# Patient Record
Sex: MALE | Race: WHITE | NOT HISPANIC OR LATINO | URBAN - METROPOLITAN AREA
[De-identification: names, ages, dates, MRNs, and addresses within clinical notes are randomized per-mention and may not be internally consistent; named-entity substitution may affect disease eponyms.]

---

## 2023-01-23 ENCOUNTER — OFFICE VISIT (OUTPATIENT)
Dept: URBAN - METROPOLITAN AREA CLINIC 32 | Facility: CLINIC | Age: 58
End: 2023-01-23
Payer: COMMERCIAL

## 2023-01-23 DIAGNOSIS — H43.12 VITREOUS HEMORRHAGE, LEFT EYE: Primary | ICD-10-CM

## 2023-01-23 PROCEDURE — 92134 CPTRZ OPH DX IMG PST SGM RTA: CPT

## 2023-01-23 PROCEDURE — 99204 OFFICE O/P NEW MOD 45 MIN: CPT

## 2023-01-23 ASSESSMENT — INTRAOCULAR PRESSURE
OD: 6
OS: 8

## 2023-01-23 NOTE — IMPRESSION/PLAN
Impression: Vitreous hemorrhage, left eye: H43.12.
--hospitalized 1/21/2023 for possible stroke Plan: Pt educated on findings. Mac OCT shows flat and intact OD but unable to obtain scan OS. DFE shows large central vit heme OS with a limited view of the periphery. Retina appears flat and in-tact in periphery otherwise. Referral sent to Dr. Lola Tiwari for further evaluation and management.

## 2023-01-27 ENCOUNTER — OFFICE VISIT (OUTPATIENT)
Dept: URBAN - METROPOLITAN AREA CLINIC 23 | Facility: CLINIC | Age: 58
End: 2023-01-27
Payer: COMMERCIAL

## 2023-01-27 DIAGNOSIS — E11.9 TYPE 2 DIABETES W/O COMPLICATIONS: ICD-10-CM

## 2023-01-27 DIAGNOSIS — E11.3592 DIABETES MELLITUS TYPE 2 WITH PROLIFERATIVE RETINOPATHY WITHOUT MACULAR EDEMA, LEFT EYE: Primary | ICD-10-CM

## 2023-01-27 PROCEDURE — 92134 CPTRZ OPH DX IMG PST SGM RTA: CPT | Performed by: OPHTHALMOLOGY

## 2023-01-27 PROCEDURE — 99204 OFFICE O/P NEW MOD 45 MIN: CPT | Performed by: OPHTHALMOLOGY

## 2023-01-27 PROCEDURE — 67028 INJECTION EYE DRUG: CPT | Performed by: OPHTHALMOLOGY

## 2023-01-27 ASSESSMENT — INTRAOCULAR PRESSURE
OD: 17
OS: 16

## 2023-01-27 NOTE — IMPRESSION/PLAN
Impression: Vitreous hemorrhage, left eye associated with PDR: H43.12. Left. Condition: unstable. Vision: vision affected. Plan: Discussed diagnosis in detail with patient. Discussed risks of progression. Recommend FATOUMATA tx OS - see notes above.

## 2023-01-27 NOTE — IMPRESSION/PLAN
Impression: Type 2 diabetes w/o complications: O86.4. Right. Condition: new prob, no addtl w/u needed. Vision: vision not affected. Plan: Discussed diagnosis in detail with patient. Exam shows no Diabetic changes OD. No treatment is recommended at this time. Emphasized blood sugar control and advised to keep future appointments with PCP and/or Endocrinologist for the management of Diabetes. Recommend observation for now. OCT OD is normal and Optos OD shows the macula / retina is stable.

## 2023-01-27 NOTE — IMPRESSION/PLAN
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 Left. Condition: new problem addtl w/u needed. Vision: vision affected. Diagnosed with DM x 3 mos ago. Plan: Discussed diagnosis in detail with patient. Discussed risks of progression. Based on today's exam, diagnostic studies and review of records, recommend Intravitreal Injection Treatment LEFT EYE with AVASTIN to help reduce the bleeding in order to prevent a further reduction in vision. Discussed the risks and benefits of tx. All questions answered. Patient elects to proceed with recommendation. OCT OS unable to obtain and Optos OS shows VH. If no change or improvement in the future, surgery can be consider to remove the blood.

## 2023-03-01 ENCOUNTER — OFFICE VISIT (OUTPATIENT)
Dept: URBAN - METROPOLITAN AREA CLINIC 23 | Facility: CLINIC | Age: 58
End: 2023-03-01
Payer: COMMERCIAL

## 2023-03-01 DIAGNOSIS — H33.022 RETINAL DETACHMENT WITH MULTIPLE BREAKS, LEFT EYE: Primary | ICD-10-CM

## 2023-03-01 DIAGNOSIS — H43.12 VITREOUS HEMORRHAGE, LEFT EYE: ICD-10-CM

## 2023-03-01 PROCEDURE — 99214 OFFICE O/P EST MOD 30 MIN: CPT | Performed by: OPHTHALMOLOGY

## 2023-03-01 PROCEDURE — 92134 CPTRZ OPH DX IMG PST SGM RTA: CPT | Performed by: OPHTHALMOLOGY

## 2023-03-01 RX ORDER — PREDNISOLONE ACETATE 10 MG/ML
1 % SUSPENSION/ DROPS OPHTHALMIC
Qty: 10 | Refills: 1 | Status: ACTIVE
Start: 2023-03-01

## 2023-03-01 RX ORDER — OFLOXACIN 3 MG/ML
0.3 % SOLUTION/ DROPS OPHTHALMIC
Qty: 5 | Refills: 1 | Status: ACTIVE
Start: 2023-03-01

## 2023-03-01 ASSESSMENT — INTRAOCULAR PRESSURE
OS: 11
OD: 16

## 2023-03-01 NOTE — IMPRESSION/PLAN
Impression: Retinal detachment with multiple breaks, left eye: H33.022. Left. Condition: unstable. Vision: vision affected. Plan: Discussed diagnosis in detail with patient. Exam OS shows superior retinal tear, multiple tears. Discussed risks of progression. Surgical treatment is recommended to repair the retina PPVx LEFT EYE. Surgical risks and benefits were discussed, explained and understood by patient. Unable to tell how much vision will be recovered. Discussed gas bubble and post-op care: no traveling, flying or driving to high altitude for approximately 6 - 8 weeks. Advise patient that he will not be able to see after the patch is removed on 1 day post-op and will not be able to see for weeks. As previously mentioned, the vision will slowly improve as the gas dissolves and will continue to slowly improve long after the gas is gone. All questions answered. Patient elects to proceed with recommendation. RL1. E'rxed Ofloxacin and Prednisolone. OCT and Optos OS shows retinal detachment.

## 2023-03-01 NOTE — IMPRESSION/PLAN
Impression: Vitreous hemorrhage, left eye: H43.12. Left. Condition: unstable. Vision: vision affected. s/p AV OS #1 01/27/23 Plan: Discussed diagnosis in detail with patient. Discussed risks of progression. Recommend surgery OS - see notes above.

## 2023-03-03 ENCOUNTER — POST-OPERATIVE VISIT (OUTPATIENT)
Dept: URBAN - METROPOLITAN AREA CLINIC 32 | Facility: CLINIC | Age: 58
End: 2023-03-03
Payer: COMMERCIAL

## 2023-03-03 DIAGNOSIS — Z48.810 ENCOUNTER FOR SURGICAL AFTERCARE FOLLOWING SURGERY ON A SENSE ORGAN: Primary | ICD-10-CM

## 2023-03-03 PROCEDURE — 99024 POSTOP FOLLOW-UP VISIT: CPT | Performed by: OPTOMETRIST

## 2023-03-03 ASSESSMENT — INTRAOCULAR PRESSURE
OS: 16
OD: 16

## 2023-03-03 NOTE — IMPRESSION/PLAN
Impression: S/P 22841; 25G Posterior Vitrectomy; Laser photocoagulation: Endolaser; [id number]; Intravitreal injection of medications: Kenalog; Intravitreal Injection of gas: C3F8 18%; AFX (Air Fluid Gas Exchange) OS - 1 Day. Encounter for surgical aftercare following surgery on a sense organ  Z48.810. Plan: The surgical eye(s) is improving well. Continue to follow current drop plan and post operative instructions. Recommend artificial tears throughout post operative period. Patient's blood sugar was 69 in clinic, gave patient crackers and cookies in clinic.

## 2023-04-04 ENCOUNTER — POST-OPERATIVE VISIT (OUTPATIENT)
Dept: URBAN - METROPOLITAN AREA CLINIC 23 | Facility: CLINIC | Age: 58
End: 2023-04-04
Payer: COMMERCIAL

## 2023-04-04 DIAGNOSIS — Z48.810 ENCOUNTER FOR SURGICAL AFTERCARE FOLLOWING SURGERY ON A SENSE ORGAN: Primary | ICD-10-CM

## 2023-04-04 PROCEDURE — 99024 POSTOP FOLLOW-UP VISIT: CPT | Performed by: OPHTHALMOLOGY

## 2023-04-04 ASSESSMENT — INTRAOCULAR PRESSURE
OD: 20
OS: 20

## 2023-04-04 NOTE — IMPRESSION/PLAN
Impression: S/P 72470; 25G Posterior Vitrectomy; Laser photocoagulation: Endolaser; [id number]; Intravitreal injection of medications: Kenalog; Intravitreal Injection of gas: C3F8 18%; AFX (Air Fluid Gas Exchange) OS - 33 Days. Encounter for surgical aftercare following surgery on a sense organ  Z48.810. Plan: Exam OS shows the retina to be attached and stable w/ 50% gas bubble. Patient states that he still cant see. Advised patient that his vision will be poor while the gas bubble is present. We need to allow the bubble to first dissipate and make sure the retina is still intact and stable. Explained to patient that after a retinal detachment, some vision loss may be permanent, but is it too early to tell that right now. Need to give the eye time, and allow the gas bubble to dissipate. Will reassess the retina in 4 weeks. Use Prednisolone QID ODS until gone, then may d/c.

## 2023-05-01 ENCOUNTER — POST-OPERATIVE VISIT (OUTPATIENT)
Dept: URBAN - METROPOLITAN AREA CLINIC 33 | Facility: CLINIC | Age: 58
End: 2023-05-01
Payer: COMMERCIAL

## 2023-05-01 DIAGNOSIS — Z48.810 ENCOUNTER FOR SURGICAL AFTERCARE FOLLOWING SURGERY ON A SENSE ORGAN: Primary | ICD-10-CM

## 2023-05-01 DIAGNOSIS — H25.12 AGE-RELATED NUCLEAR CATARACT, LEFT EYE: ICD-10-CM

## 2023-05-01 PROCEDURE — 99024 POSTOP FOLLOW-UP VISIT: CPT | Performed by: OPHTHALMOLOGY

## 2023-05-01 ASSESSMENT — INTRAOCULAR PRESSURE
OD: 20
OS: 18

## 2023-05-01 NOTE — IMPRESSION/PLAN
Impression: S/P 04403; 25G Posterior Vitrectomy; Laser photocoagulation: Endolaser; [id number]; Intravitreal injection of medications: Kenalog; Intravitreal Injection of gas: C3F8 18%; AFX (Air Fluid Gas Exchange) OS - 60 Days. Encounter for surgical aftercare following surgery on a sense organ  Z48.810. Plan: Exam OS shows fluid inferior in the retina, and a cataract. OCT shows questionable fluid OD. Discussed diagnosis in detail with patient. Discussed risks of progression. Surgical treatment is recommended to remove the cataract and repair the retina PPVx, Lensectomy RIGHT EYE. Discussed that we will NOT be implanting a lens at this time, patient will need another surgery in the future, to implant an IOL once the eye is healed. Surgical risks and benefits were discussed, explained and understood by patient. Unable to tell how much vision will be recovered. Discussed using a heavy liquid or oil, and that if used will need another surgery in the future once the retina is stable and healed to remove the oil. All questions answered. Patient elects to proceed with recommendation. RL1.

H33.022 - RD OD
H25.12 - NS OD Discussed with patient that last surgery he was very anxious, and didn't keep still during surgery. Asked patient if he had any other issues with previous surgery with local anesthesia, patient denies any previous issues w/ local anesthesia. Gave patient option of proceeding with surgery w/ local and sedation vs general anesthesia. Pt feels that he can continue w/ local and sedation.

## 2023-05-09 ENCOUNTER — SURGERY (OUTPATIENT)
Dept: URBAN - METROPOLITAN AREA SURGERY 11 | Facility: SURGERY | Age: 58
End: 2023-05-09
Payer: COMMERCIAL

## 2023-05-09 PROCEDURE — 67113 REPAIR RETINAL DETACH CPLX: CPT | Performed by: OPHTHALMOLOGY

## 2023-05-10 ENCOUNTER — POST-OPERATIVE VISIT (OUTPATIENT)
Dept: URBAN - METROPOLITAN AREA CLINIC 32 | Facility: CLINIC | Age: 58
End: 2023-05-10
Payer: COMMERCIAL

## 2023-05-10 DIAGNOSIS — Z48.810 ENCOUNTER FOR SURGICAL AFTERCARE FOLLOWING SURGERY ON A SENSE ORGAN: Primary | ICD-10-CM

## 2023-05-10 PROCEDURE — 99024 POSTOP FOLLOW-UP VISIT: CPT | Performed by: OPTOMETRIST

## 2023-05-10 RX ORDER — BRIMONIDINE TARTRATE, TIMOLOL MALEATE 2; 5 MG/ML; MG/ML
SOLUTION/ DROPS OPHTHALMIC
Qty: 15 | Refills: 3 | Status: INACTIVE
Start: 2023-05-10 | End: 2023-05-10

## 2023-05-10 ASSESSMENT — INTRAOCULAR PRESSURE
OS: 24
OD: 19

## 2023-05-10 NOTE — IMPRESSION/PLAN
Impression: S/P [place] Vitrectomy 25 ga [id number]; Epiretinal Membranectomy; Endo laser; Limbal Lensectomy; Intravitreal Injection of Perfluoron, Invitravitreal injection of 12% C3F8 OS - 1 Day. Encounter for surgical aftercare following surgery on a sense organ  Z48.810. Plan: The surgical eye(s) is improving well. Continue to follow current drop plan and post operative instructions. Recommend artificial tears throughout post operative period. Start Combigan BID OS. RTC for scheduled follow up.

## 2023-05-19 ENCOUNTER — POST-OPERATIVE VISIT (OUTPATIENT)
Dept: URBAN - METROPOLITAN AREA CLINIC 32 | Facility: CLINIC | Age: 58
End: 2023-05-19
Payer: COMMERCIAL

## 2023-05-19 DIAGNOSIS — Z48.810 ENCOUNTER FOR SURGICAL AFTERCARE FOLLOWING SURGERY ON A SENSE ORGAN: Primary | ICD-10-CM

## 2023-05-19 PROCEDURE — 99024 POSTOP FOLLOW-UP VISIT: CPT | Performed by: OPTOMETRIST

## 2023-05-19 RX ORDER — TIMOLOL 5.12 MG/ML
0.5 % SOLUTION/ DROPS OPHTHALMIC
Qty: 5 | Refills: 3 | Status: ACTIVE
Start: 2023-05-19

## 2023-05-19 RX ORDER — PREDNISOLONE ACETATE 10 MG/ML
1 % SUSPENSION/ DROPS OPHTHALMIC
Qty: 10 | Refills: 1 | Status: ACTIVE
Start: 2023-05-19

## 2023-05-19 ASSESSMENT — INTRAOCULAR PRESSURE
OS: 17
OD: 18

## 2023-05-19 NOTE — IMPRESSION/PLAN
Impression: S/P [place] Vitrectomy 25 ga [id number]; Epiretinal Membranectomy; Endo laser; Limbal Lensectomy; Intravitreal Injection of Perfluoron, Invitravitreal injection of 12% C3F8 OS - 10 Days. Encounter for surgical aftercare following surgery on a sense organ  Z48.810. Plan: The surgical eye(s) is improving well. Continue to follow current drop plan and post operative instructions. Recommend artificial tears throughout post operative period.  RTC in 1 week with Dr. Robert Zabala for PO

## 2023-05-26 ENCOUNTER — POST-OPERATIVE VISIT (OUTPATIENT)
Dept: URBAN - METROPOLITAN AREA CLINIC 23 | Facility: CLINIC | Age: 58
End: 2023-05-26
Payer: COMMERCIAL

## 2023-05-26 DIAGNOSIS — Z48.810 ENCOUNTER FOR SURGICAL AFTERCARE FOLLOWING SURGERY ON A SENSE ORGAN: Primary | ICD-10-CM

## 2023-05-26 PROCEDURE — 99024 POSTOP FOLLOW-UP VISIT: CPT | Performed by: OPHTHALMOLOGY

## 2023-05-26 ASSESSMENT — INTRAOCULAR PRESSURE
OD: 18
OS: 17

## 2023-05-26 NOTE — IMPRESSION/PLAN
Impression: S/P [place] Vitrectomy 25 ga [id number]; Epiretinal Membranectomy; Endo laser; Limbal Lensectomy; Intravitreal Injection of Perfluoron, Invitravitreal injection of 12% C3F8 OS - 17 Days. Encounter for surgical aftercare following surgery on a sense organ  Z48.810. Plan: Discussed diagnosis in great detail with patient. Discussed risks of progression. Surgical treatment is recommended to remove PFO PPVx LEFT EYE. Patient elects to proceed with surgery with PFO removal. All questions answered. RL1. E'rxed Ofloxacin and Prednisolone. OCT OS shows retina appears attached centrally and Optos OS shows hazy view. Continue Prednisolone QID OS. Patient may gradually resume normal activity - no lifting more then 50lbs. No sleep positioning restrictions at this time. No flying/traveling to high altitudes until gas bubble is fully dissolved.

## 2023-06-06 ENCOUNTER — SURGERY (OUTPATIENT)
Dept: URBAN - METROPOLITAN AREA SURGERY 11 | Facility: SURGERY | Age: 58
End: 2023-06-06
Payer: COMMERCIAL

## 2023-06-06 PROCEDURE — 67036 REMOVAL OF INNER EYE FLUID: CPT | Performed by: OPHTHALMOLOGY

## 2023-06-07 ENCOUNTER — POST-OPERATIVE VISIT (OUTPATIENT)
Dept: URBAN - METROPOLITAN AREA CLINIC 32 | Facility: CLINIC | Age: 58
End: 2023-06-07
Payer: COMMERCIAL

## 2023-06-07 DIAGNOSIS — Z48.810 ENCOUNTER FOR SURGICAL AFTERCARE FOLLOWING SURGERY ON A SENSE ORGAN: Primary | ICD-10-CM

## 2023-06-07 PROCEDURE — 99024 POSTOP FOLLOW-UP VISIT: CPT | Performed by: OPTOMETRIST

## 2023-06-07 RX ORDER — OFLOXACIN 3 MG/ML
0.3 % SOLUTION/ DROPS OPHTHALMIC
Qty: 5 | Refills: 1 | Status: ACTIVE
Start: 2023-06-07

## 2023-06-07 ASSESSMENT — INTRAOCULAR PRESSURE
OS: 8
OD: 10
OD: 16

## 2023-06-07 NOTE — IMPRESSION/PLAN
Impression: S/P Pars Pl[place] Vitrectomy 25ga; AFX (Air Fluid Gas Exchange); Perfluoron Removal OS - 1 Day. Encounter for surgical aftercare following surgery on a sense organ  Z48.810. Plan: The surgical eye(s) is improving well. Continue to follow current drop plan and post operative instructions. Recommend artificial tears throughout post operative period. RTC for scheduled follow up.

## 2023-06-13 ENCOUNTER — POST-OPERATIVE VISIT (OUTPATIENT)
Dept: URBAN - METROPOLITAN AREA CLINIC 32 | Facility: CLINIC | Age: 58
End: 2023-06-13
Payer: COMMERCIAL

## 2023-06-13 DIAGNOSIS — Z48.810 ENCOUNTER FOR SURGICAL AFTERCARE FOLLOWING SURGERY ON A SENSE ORGAN: Primary | ICD-10-CM

## 2023-06-13 PROCEDURE — 99024 POSTOP FOLLOW-UP VISIT: CPT | Performed by: OPTOMETRIST

## 2023-06-13 ASSESSMENT — INTRAOCULAR PRESSURE
OS: 8
OD: 12

## 2023-06-13 NOTE — IMPRESSION/PLAN
Impression: S/P [place] Vitrectomy 25ga; AFX (Air Fluid Gas Exchange); Perfluoron Removal OS - 7 Days. Encounter for surgical aftercare following surgery on a sense organ  Z48.810. Plan: The surgical eye(s) is improving well. Continue to follow current drop plan and post operative instructions. Recommend artificial tears throughout post operative period. RTC for scheduled follow up.

## 2023-06-22 ENCOUNTER — POST-OPERATIVE VISIT (OUTPATIENT)
Dept: URBAN - METROPOLITAN AREA CLINIC 33 | Facility: CLINIC | Age: 58
End: 2023-06-22
Payer: COMMERCIAL

## 2023-06-22 DIAGNOSIS — Z48.810 ENCOUNTER FOR SURGICAL AFTERCARE FOLLOWING SURGERY ON A SENSE ORGAN: Primary | ICD-10-CM

## 2023-06-22 PROCEDURE — 99024 POSTOP FOLLOW-UP VISIT: CPT | Performed by: OPHTHALMOLOGY

## 2023-06-22 ASSESSMENT — INTRAOCULAR PRESSURE: OS: 20

## 2023-06-22 NOTE — IMPRESSION/PLAN
Impression: S/P Pars Pl[place] Vitrectomy 25ga; AFX (Air Fluid Gas Exchange); Perfluoron Removal OS - 16 Days. Encounter for surgical aftercare following surgery on a sense organ  Z48.810. Plan: Exam OS shows few PFO droplets in AC, retina appears to be fully attached. Unable to obtain OCT OS. No flying restrictions. Continue using Prednisolone QID OS. Discussed possible IOL implant once the eye stabilizes which could be anywhere from 3 - 12 months. Will continue to monitor and observe. Recommend a retina f/u in 6 weeks - sooner if any changes.

## 2023-08-03 ENCOUNTER — POST-OPERATIVE VISIT (OUTPATIENT)
Dept: URBAN - METROPOLITAN AREA CLINIC 33 | Facility: CLINIC | Age: 58
End: 2023-08-03
Payer: COMMERCIAL

## 2023-08-03 DIAGNOSIS — Z48.810 ENCOUNTER FOR SURGICAL AFTERCARE FOLLOWING SURGERY ON A SENSE ORGAN: Primary | ICD-10-CM

## 2023-08-03 PROCEDURE — 99024 POSTOP FOLLOW-UP VISIT: CPT | Performed by: OPHTHALMOLOGY

## 2023-08-03 RX ORDER — PREDNISOLONE ACETATE 10 MG/ML
1 % SUSPENSION/ DROPS OPHTHALMIC
Qty: 10 | Refills: 5 | Status: ACTIVE
Start: 2023-08-03

## 2023-08-03 RX ORDER — TIMOLOL 5.12 MG/ML
0.5 % SOLUTION/ DROPS OPHTHALMIC
Qty: 5 | Refills: 3 | Status: INACTIVE
Start: 2023-08-03 | End: 2023-08-04

## 2023-08-03 ASSESSMENT — INTRAOCULAR PRESSURE
OS: 11
OD: 20

## 2023-09-14 ENCOUNTER — OFFICE VISIT (OUTPATIENT)
Dept: URBAN - METROPOLITAN AREA CLINIC 32 | Facility: CLINIC | Age: 58
End: 2023-09-14
Payer: COMMERCIAL

## 2023-09-14 DIAGNOSIS — Z48.810 ENCOUNTER FOR SURGICAL AFTERCARE FOLLOWING SURGERY ON A SENSE ORGAN: ICD-10-CM

## 2023-09-14 DIAGNOSIS — H27.02 APHAKIA, LEFT EYE: Primary | ICD-10-CM

## 2023-09-14 PROCEDURE — 99213 OFFICE O/P EST LOW 20 MIN: CPT | Performed by: OPTOMETRIST

## 2023-09-14 ASSESSMENT — VISUAL ACUITY: OS: 20/150+

## 2023-09-19 ENCOUNTER — OFFICE VISIT (OUTPATIENT)
Dept: URBAN - METROPOLITAN AREA CLINIC 23 | Facility: CLINIC | Age: 58
End: 2023-09-19
Payer: COMMERCIAL

## 2023-09-19 DIAGNOSIS — H27.02 APHAKIA, LEFT EYE: ICD-10-CM

## 2023-09-19 DIAGNOSIS — H33.022 RETINAL DETACHMENT WITH MULTIPLE BREAKS, LEFT EYE: Primary | ICD-10-CM

## 2023-09-19 PROCEDURE — 92134 CPTRZ OPH DX IMG PST SGM RTA: CPT | Performed by: OPHTHALMOLOGY

## 2023-09-19 PROCEDURE — 99213 OFFICE O/P EST LOW 20 MIN: CPT | Performed by: OPHTHALMOLOGY

## 2023-09-19 RX ORDER — KETOROLAC TROMETHAMINE 5 MG/ML
0.5 % SOLUTION OPHTHALMIC
Qty: 10 | Refills: 5 | Status: ACTIVE
Start: 2023-09-19

## 2023-09-19 ASSESSMENT — INTRAOCULAR PRESSURE
OS: 8
OD: 19

## 2024-01-23 ENCOUNTER — OFFICE VISIT (OUTPATIENT)
Dept: URBAN - METROPOLITAN AREA CLINIC 23 | Facility: CLINIC | Age: 59
End: 2024-01-23
Payer: COMMERCIAL

## 2024-01-23 PROCEDURE — 92134 CPTRZ OPH DX IMG PST SGM RTA: CPT | Performed by: OPHTHALMOLOGY

## 2024-01-23 PROCEDURE — 99213 OFFICE O/P EST LOW 20 MIN: CPT | Performed by: OPHTHALMOLOGY

## 2024-01-23 ASSESSMENT — INTRAOCULAR PRESSURE
OS: 16
OD: 15

## 2024-02-07 ENCOUNTER — OFFICE VISIT (OUTPATIENT)
Dept: URBAN - METROPOLITAN AREA CLINIC 32 | Facility: CLINIC | Age: 59
End: 2024-02-07
Payer: COMMERCIAL

## 2024-02-07 DIAGNOSIS — H33.022 RETINAL DETACHMENT WITH MULTIPLE BREAKS, LEFT EYE: ICD-10-CM

## 2024-02-07 DIAGNOSIS — H27.02 APHAKIA, LEFT EYE: Primary | ICD-10-CM

## 2024-02-07 PROCEDURE — 99214 OFFICE O/P EST MOD 30 MIN: CPT | Performed by: OPHTHALMOLOGY

## 2024-02-07 ASSESSMENT — INTRAOCULAR PRESSURE
OD: 17
OS: 16

## 2024-03-04 ENCOUNTER — TECH ONLY (OUTPATIENT)
Dept: URBAN - METROPOLITAN AREA CLINIC 40 | Facility: CLINIC | Age: 59
End: 2024-03-04
Payer: COMMERCIAL

## 2024-03-04 DIAGNOSIS — H27.02 APHAKIA, LEFT EYE: Primary | ICD-10-CM

## 2024-03-04 ASSESSMENT — KERATOMETRY
OD: 43.25
OS: 44.00

## 2024-03-04 ASSESSMENT — PACHYMETRY
OD: 23.98
OD: 3.10
OS: 23.89
OS: 0.00

## 2024-03-14 ENCOUNTER — SURGERY (OUTPATIENT)
Dept: URBAN - METROPOLITAN AREA SURGERY 11 | Facility: SURGERY | Age: 59
End: 2024-03-14
Payer: COMMERCIAL

## 2024-03-14 PROCEDURE — 66985 INSERT LENS PROSTHESIS: CPT | Performed by: OPHTHALMOLOGY

## 2024-03-15 ENCOUNTER — POST-OPERATIVE VISIT (OUTPATIENT)
Dept: URBAN - METROPOLITAN AREA CLINIC 32 | Facility: CLINIC | Age: 59
End: 2024-03-15
Payer: COMMERCIAL

## 2024-03-15 DIAGNOSIS — Z48.810 ENCOUNTER FOR SURGICAL AFTERCARE FOLLOWING SURGERY ON A SENSE ORGAN: Primary | ICD-10-CM

## 2024-03-15 PROCEDURE — 99024 POSTOP FOLLOW-UP VISIT: CPT | Performed by: OPTOMETRIST

## 2024-03-15 RX ORDER — OFLOXACIN 3 MG/ML
0.3 % SOLUTION/ DROPS OPHTHALMIC
Qty: 5 | Refills: 1 | Status: ACTIVE
Start: 2024-03-15

## 2024-03-15 RX ORDER — PREDNISOLONE ACETATE 10 MG/ML
1 % SUSPENSION/ DROPS OPHTHALMIC
Qty: 10 | Refills: 5 | Status: ACTIVE
Start: 2024-03-15

## 2024-03-15 ASSESSMENT — INTRAOCULAR PRESSURE
OD: 14
OS: 8

## 2024-03-26 ENCOUNTER — POST-OPERATIVE VISIT (OUTPATIENT)
Dept: URBAN - METROPOLITAN AREA CLINIC 32 | Facility: CLINIC | Age: 59
End: 2024-03-26
Payer: COMMERCIAL

## 2024-03-26 DIAGNOSIS — Z48.810 ENCOUNTER FOR SURGICAL AFTERCARE FOLLOWING SURGERY ON A SENSE ORGAN: Primary | ICD-10-CM

## 2024-03-26 PROCEDURE — 99024 POSTOP FOLLOW-UP VISIT: CPT | Performed by: OPHTHALMOLOGY

## 2024-03-26 ASSESSMENT — INTRAOCULAR PRESSURE
OD: 15
OS: 16